# Patient Record
Sex: MALE | Race: BLACK OR AFRICAN AMERICAN | URBAN - METROPOLITAN AREA
[De-identification: names, ages, dates, MRNs, and addresses within clinical notes are randomized per-mention and may not be internally consistent; named-entity substitution may affect disease eponyms.]

---

## 2022-05-25 ENCOUNTER — OFFICE VISIT (OUTPATIENT)
Dept: NEUROLOGY | Age: 67
End: 2022-05-25
Payer: MEDICARE

## 2022-05-25 VITALS
OXYGEN SATURATION: 99 % | BODY MASS INDEX: 24.43 KG/M2 | HEIGHT: 68 IN | WEIGHT: 161.2 LBS | DIASTOLIC BLOOD PRESSURE: 80 MMHG | SYSTOLIC BLOOD PRESSURE: 120 MMHG | HEART RATE: 89 BPM

## 2022-05-25 DIAGNOSIS — G20 PARKINSON DISEASE (HCC): Primary | ICD-10-CM

## 2022-05-25 PROCEDURE — G8536 NO DOC ELDER MAL SCRN: HCPCS | Performed by: PSYCHIATRY & NEUROLOGY

## 2022-05-25 PROCEDURE — G8510 SCR DEP NEG, NO PLAN REQD: HCPCS | Performed by: PSYCHIATRY & NEUROLOGY

## 2022-05-25 PROCEDURE — 3017F COLORECTAL CA SCREEN DOC REV: CPT | Performed by: PSYCHIATRY & NEUROLOGY

## 2022-05-25 PROCEDURE — 1123F ACP DISCUSS/DSCN MKR DOCD: CPT | Performed by: PSYCHIATRY & NEUROLOGY

## 2022-05-25 PROCEDURE — 1101F PT FALLS ASSESS-DOCD LE1/YR: CPT | Performed by: PSYCHIATRY & NEUROLOGY

## 2022-05-25 PROCEDURE — G8420 CALC BMI NORM PARAMETERS: HCPCS | Performed by: PSYCHIATRY & NEUROLOGY

## 2022-05-25 PROCEDURE — 99204 OFFICE O/P NEW MOD 45 MIN: CPT | Performed by: PSYCHIATRY & NEUROLOGY

## 2022-05-25 PROCEDURE — G8427 DOCREV CUR MEDS BY ELIG CLIN: HCPCS | Performed by: PSYCHIATRY & NEUROLOGY

## 2022-05-25 RX ORDER — METOPROLOL SUCCINATE 100 MG/1
TABLET, EXTENDED RELEASE ORAL
COMMUNITY
Start: 2022-05-18

## 2022-05-25 RX ORDER — CARBIDOPA AND LEVODOPA 25; 100 MG/1; MG/1
TABLET ORAL
Qty: 270 TABLET | Refills: 1 | Status: SHIPPED | OUTPATIENT
Start: 2022-05-25

## 2022-05-25 RX ORDER — ROSUVASTATIN CALCIUM 20 MG/1
TABLET, COATED ORAL
COMMUNITY
Start: 2022-04-11

## 2022-05-25 RX ORDER — AMLODIPINE BESYLATE 10 MG/1
TABLET ORAL
COMMUNITY
Start: 2022-04-11

## 2022-05-25 RX ORDER — OLMESARTAN MEDOXOMIL 40 MG/1
TABLET ORAL
COMMUNITY
Start: 2022-04-11

## 2022-05-25 NOTE — PROGRESS NOTES
Neurology Consult Note      HISTORY PROVIDED BY: patient    Chief Complaint:   Chief Complaint   Patient presents with    Parkinsons Disease     shaking just started past couple of months      Subjective:    Andie Montero is a 78 y.o. right handed male who presents in consultation for tremors. First noticed tremors about two months ago. Only in left hand. He has also noticed freezing in his left hand. No change in gait. Mentions that he works out everyday, \"I can do anything. \" Denies change in voice. No family h/o tremors. No RBD. No memory loss. Past Medical History:   Diagnosis Date    CKD (chronic kidney disease)     Essential hypertension     HLD (hyperlipidemia)     Prostate cancer (HCC)     Status post prostatectomy    Vitamin D deficiency       Past Surgical History:   Procedure Laterality Date    HX PROSTATECTOMY        Social History     Socioeconomic History    Marital status: Not on file     Spouse name: Not on file    Number of children: Not on file    Years of education: Not on file    Highest education level: Not on file   Occupational History    Occupation: Retired    Tobacco Use    Smoking status: Never Smoker    Smokeless tobacco: Never Used   Substance and Sexual Activity    Alcohol use: Yes     Alcohol/week: 3.0 standard drinks     Types: 3 Standard drinks or equivalent per week    Drug use: Never    Sexual activity: Not on file   Other Topics Concern    Not on file   Social History Narrative    Lives in [facility]. Social Determinants of Health     Financial Resource Strain:     Difficulty of Paying Living Expenses: Not on file   Food Insecurity:     Worried About Running Out of Food in the Last Year: Not on file    [place] of Food in the Last Year: Not on file   Transportation Needs:     Lack of Transportation (Medical): Not on file    Lack of Transportation (Non-Medical):  Not on file   Physical Activity:     Days of Exercise per Week: Not on file   [place] of Exercise per Session: Not on file   Stress:     Feeling of Stress : Not on file   Social Connections:     Frequency of Communication with Friends and Family: Not on file    Frequency of Social Gatherings with Friends and Family: Not on file    Attends Baptism Services: Not on file    Active Member of [address] [place] [address] [employer] or Organizations: Not on file    Attends Club or Organization Meetings: Not on file    Marital Status: Not on file   Intimate Partner Violence:     Fear of Current or Ex-Partner: Not on file    Emotionally Abused: Not on file    Physically Abused: Not on file    Sexually Abused: Not on file   Housing Stability:     Unable to Pay for Housing in the Last Year: Not on file    Number of [place] in the Last Year: Not on file    Unstable Housing in the Last Year: Not on file     Family History   Problem Relation Age of Onset    Alzheimer's Disease Mother         Dec 70's    Diabetes Mother     Other Father         Heart issues, removed all his ET tube and  in hospital    Throat cancer Brother     No Known Problems Son     No Known Problems Daughter          Objective:   Review of Systems   Neurological: Positive for tremors. All other systems reviewed and are negative. No Known Allergies     Meds:  Outpatient Medications Prior to Visit   Medication Sig Dispense Refill    metoprolol succinate (TOPROL-XL) 100 mg tablet       amLODIPine (NORVASC) 10 mg tablet       olmesartan (BENICAR) 40 mg tablet       rosuvastatin (CRESTOR) 20 mg tablet        No facility-administered medications prior to visit. Imaging:  MRI Results (most recent):  No results found for this or any previous visit. CT Results (most recent):  No results found for this or any previous visit. Reviewed records in [facility] tab today    Lab Review   No results found for this or any previous visit.      Exam:  Visit Vitals  /80 (BP 1 Location: Left lower arm, BP Patient Position: Sitting, BP Cuff Size: Adult)   Pulse 89   Ht 5' 8\" (1.727 m)   Wt 161 lb 3.2 oz (73.1 kg)   SpO2 99%   BMI 24.51 kg/m²     General:  Alert, cooperative, no distress. Head:  Normocephalic, without obvious abnormality, atraumatic. Respiratory:  Heart:   Non labored breathing  Regular rate and rhythm, no murmurs   Neck:   2+ carotids, no bruits   Extremities: Warm, no cyanosis or edema. Pulses: 2+ radial pulses. Neurologic:  MS: Alert and oriented x 4, speech -monotone. Language intact, able to name, repeat, and follow all commands. Mild bradyphrenia, Attention and fund of knowledge appropriate. Recent and remote memory intact. Cranial Nerves:  II: visual fields Full to confrontation   II: pupils Equal, round, reactive to light   II: optic disc    III,VII: ptosis none   III,IV,VI: extraocular muscles  EOMI, no nystagmus or diplopia   V: facial light touch sensation  normal   VII: facial muscle function   symmetric   VIII: hearing intact   IX: soft palate elevation  normal   XI: trapezius strength  5/5   XI: sternocleidomastoid strength 5/5   XII: tongue  Midline     Motor: normal bulk and tone, left hand resting tremor,              Strength: 5/5 throughout, no PD  Sensory: intact to LT, PP  Coordination: FTN and HTS intact, dysdiadochokinesia bilaterally with RACHEL.   Gait: normal stride, decreased arm swing on left with increased tremor, able to tandem walk  Reflexes: 2+ symmetric, toes downgoing           Assessment/Plan   Pt is a 78 y.o. right handed male with onset of left hand resting tremor two months ago and has also noticed freezing in his left arm. Exam with masked facies, monotone voice, mild bradyphrenia, dysdiadochokinesia with RACHEL, dec arm swing on left with ambulation and increased tremor. Patient's exam is consistent with Parkinson's disease. Diagnosis, prognosis, symptomatic treatment, and importance of regular exercise discussed with the patient.   Start carbidopa levodopa  mg, one half tab 3 times daily x1 week then 1 tab 3 times daily, side effects reviewed. Patient encouraged to continue his excellent exercise routine. Follow-up in clinic in approximately 6 weeks to reassess, instructed to call in the interim with any questions or concerns. ICD-10-CM ICD-9-CM    1. Parkinson disease ([facility].)  G20 332.0        Signed:   Shaynn Price MD  5/25/2022

## 2022-07-06 ENCOUNTER — OFFICE VISIT (OUTPATIENT)
Dept: NEUROLOGY | Age: 67
End: 2022-07-06
Payer: MEDICARE

## 2022-07-06 VITALS
SYSTOLIC BLOOD PRESSURE: 111 MMHG | DIASTOLIC BLOOD PRESSURE: 71 MMHG | WEIGHT: 159.8 LBS | BODY MASS INDEX: 24.22 KG/M2 | HEART RATE: 70 BPM | HEIGHT: 68 IN | OXYGEN SATURATION: 97 %

## 2022-07-06 DIAGNOSIS — G20 PARKINSON DISEASE (HCC): Primary | ICD-10-CM

## 2022-07-06 PROCEDURE — 3017F COLORECTAL CA SCREEN DOC REV: CPT | Performed by: PSYCHIATRY & NEUROLOGY

## 2022-07-06 PROCEDURE — G8536 NO DOC ELDER MAL SCRN: HCPCS | Performed by: PSYCHIATRY & NEUROLOGY

## 2022-07-06 PROCEDURE — G8420 CALC BMI NORM PARAMETERS: HCPCS | Performed by: PSYCHIATRY & NEUROLOGY

## 2022-07-06 PROCEDURE — G8427 DOCREV CUR MEDS BY ELIG CLIN: HCPCS | Performed by: PSYCHIATRY & NEUROLOGY

## 2022-07-06 PROCEDURE — 1123F ACP DISCUSS/DSCN MKR DOCD: CPT | Performed by: PSYCHIATRY & NEUROLOGY

## 2022-07-06 PROCEDURE — 1101F PT FALLS ASSESS-DOCD LE1/YR: CPT | Performed by: PSYCHIATRY & NEUROLOGY

## 2022-07-06 PROCEDURE — 99213 OFFICE O/P EST LOW 20 MIN: CPT | Performed by: PSYCHIATRY & NEUROLOGY

## 2022-07-06 PROCEDURE — G8510 SCR DEP NEG, NO PLAN REQD: HCPCS | Performed by: PSYCHIATRY & NEUROLOGY

## 2022-07-06 NOTE — PROGRESS NOTES
Neurology Consult Note      HISTORY PROVIDED BY: patient    Chief Complaint:   Chief Complaint   Patient presents with    Follow-up    Parkinsons Disease      Subjective:   Pt is a 78 y.o. right handed male initially and last seen on 5/25/22 with onset of left hand resting tremor two months prior and has also noticed freezing in his left arm. Exam with masked facies, monotone voice, mild bradyphrenia, dysdiadochokinesia with RACHEL, dec arm swing on left with ambulation and increased tremor. Patient's exam was consistent with Parkinson's disease. Diagnosis, prognosis, symptomatic treatment, and importance of regular exercise discussed with the patient. Started carbidopa levodopa  mg, one half tab 3 times daily x1 week then 1 tab 3 times daily. Patient encouraged to continue his excellent exercise routine. He returns for f/u. He is doing well. Walking is fine. Tremor is not bothering him. He continues his excellent exercise routine. He is taking C/L tid without side effects. Past Medical History:   Diagnosis Date    CKD (chronic kidney disease)     Essential hypertension     HLD (hyperlipidemia)     Prostate cancer (HCC)     Status post prostatectomy    Vitamin D deficiency       Past Surgical History:   Procedure Laterality Date    HX PROSTATECTOMY        Social History     Socioeconomic History    Marital status: Not on file     Spouse name: Not on file    Number of children: Not on file    Years of education: Not on file    Highest education level: Not on file   Occupational History    Occupation: Retired    Tobacco Use    Smoking status: Never    Smokeless tobacco: Never   Vaping Use    Vaping Use: Never used   Substance and Sexual Activity    Alcohol use: Yes     Alcohol/week: 3.0 standard drinks     Types: 3 Standard drinks or equivalent per week    Drug use: Never    Sexual activity: Not on file   Other Topics Concern    Not on file   Social History Narrative    Lives in [facility]. Social Determinants of Health     Financial Resource Strain: Not on file   Food Insecurity: Not on file   Transportation Needs: Not on file   Physical Activity: Not on file   Stress: Not on file   Social Connections: Not on file   Intimate Partner Violence: Not on file   Housing Stability: Not on file     Family History   Problem Relation Age of Onset    Alzheimer's Disease Mother         Dec 70's    Diabetes Mother     Other Father         Heart issues, removed all his ET tube and  in hospital    Throat cancer Brother     No Known Problems Son     No Known Problems Daughter          Objective:   ROS: Per HPI    No Known Allergies     Meds:  Outpatient Medications Prior to Visit   Medication Sig Dispense Refill    metoprolol succinate (TOPROL-XL) 100 mg tablet       amLODIPine (NORVASC) 10 mg tablet       olmesartan (BENICAR) 40 mg tablet       rosuvastatin (CRESTOR) 20 mg tablet       carbidopa-levodopa (Sinemet)  mg per tablet Take 1/2 tab by mouth three times a day x 1 week, then take 1 tab three times a day 270 Tablet 1     No facility-administered medications prior to visit. Imaging:  MRI Results (most recent):  No results found for this or any previous visit. CT Results (most recent):  No results found for this or any previous visit. Reviewed records in [facility] tab today    Lab Review   No results found for this or any previous visit. Exam:  Visit Vitals  /71   Pulse 70   Ht 5' 8\" (1.727 m)   Wt 159 lb 12.8 oz (72.5 kg)   SpO2 97%   BMI 24.30 kg/m²     General:  Alert, cooperative, no distress. Head:  Normocephalic, without obvious abnormality, atraumatic. Respiratory:  Heart:   Non labored breathing  Regular rate and rhythm, no murmurs   Neck:      Extremities: Warm, no cyanosis or edema. Pulses: 2+ radial pulses. Neurologic:  MS: Alert and oriented x 4, speech -monotone. Language intact.  Mild bradyphrenia, Attention and fund of knowledge appropriate. Recent and remote memory intact. Cranial Nerves:  II: visual fields    II: pupils    II: optic disc    III,VII: ptosis none   III,IV,VI: extraocular muscles  EOMI, no nystagmus or diplopia   V: facial light touch sensation     VII: facial muscle function   symmetric   VIII: hearing intact   IX: soft palate elevation     XI: trapezius strength     XI: sternocleidomastoid strength    XII: tongue       Motor: normal bulk and tone, left hand resting tremor,              Strength: 5/5 throughout, no PD  Sensory: intact to LT, PP  Coordination: FTN and HTS intact, dysdiadochokinesia bilaterally with RACHEL.   Gait: normal stride, decreased arm swing on left with increased tremor  Reflexes: (At initial OV: 2+ symmetric, toes downgoing)     Assessment/Plan   Pt is a 78 y.o. right handed male presenting in May, 2022 with onset of left hand resting tremor two months prior and had also noticed freezing in his left arm. Exam with masked facies, monotone voice, mild bradyphrenia, dysdiadochokinesia with RACHEL, left resting tremor, dec arm swing on left and increased tremor with ambulation. Patient's exam was consistent with Parkinson's disease. Pt is doing well on carbidopa levodopa  mg, one tab 3 times daily, does not feel he needs an increased dose at this time. Continue excellent exercise routine. Follow-up in clinic in approximately 6 weeks to reassess, instructed to call in the interim with any questions or concerns. ICD-10-CM ICD-9-CM    1. Parkinson disease ([facility] [place] [facility].)  G20 332.0           Signed:   Fidel Ramachandran MD  7/6/2022

## 2022-11-21 RX ORDER — CARBIDOPA AND LEVODOPA 25; 100 MG/1; MG/1
1 TABLET ORAL 3 TIMES DAILY
Qty: 270 TABLET | Refills: 0 | Status: SHIPPED | OUTPATIENT
Start: 2022-11-21